# Patient Record
Sex: MALE | Race: WHITE
[De-identification: names, ages, dates, MRNs, and addresses within clinical notes are randomized per-mention and may not be internally consistent; named-entity substitution may affect disease eponyms.]

---

## 2018-02-07 ENCOUNTER — HOSPITAL ENCOUNTER (EMERGENCY)
Dept: HOSPITAL 62 - ER | Age: 33
LOS: 1 days | Discharge: LEFT BEFORE DISCHARGE | End: 2018-02-08
Payer: MEDICAID

## 2018-02-07 DIAGNOSIS — R11.0: ICD-10-CM

## 2018-02-07 DIAGNOSIS — F41.9: ICD-10-CM

## 2018-02-07 DIAGNOSIS — B34.9: ICD-10-CM

## 2018-02-07 DIAGNOSIS — R06.02: ICD-10-CM

## 2018-02-07 DIAGNOSIS — R09.89: ICD-10-CM

## 2018-02-07 DIAGNOSIS — F17.200: ICD-10-CM

## 2018-02-07 DIAGNOSIS — R05: ICD-10-CM

## 2018-02-07 DIAGNOSIS — R07.9: Primary | ICD-10-CM

## 2018-02-07 DIAGNOSIS — R63.0: ICD-10-CM

## 2018-02-07 DIAGNOSIS — F32.9: ICD-10-CM

## 2018-02-07 PROCEDURE — 82553 CREATINE MB FRACTION: CPT

## 2018-02-07 PROCEDURE — 71046 X-RAY EXAM CHEST 2 VIEWS: CPT

## 2018-02-07 PROCEDURE — 80053 COMPREHEN METABOLIC PANEL: CPT

## 2018-02-07 PROCEDURE — 82550 ASSAY OF CK (CPK): CPT

## 2018-02-07 PROCEDURE — 93010 ELECTROCARDIOGRAM REPORT: CPT

## 2018-02-07 PROCEDURE — 36415 COLL VENOUS BLD VENIPUNCTURE: CPT

## 2018-02-07 PROCEDURE — 85025 COMPLETE CBC W/AUTO DIFF WBC: CPT

## 2018-02-07 PROCEDURE — 84484 ASSAY OF TROPONIN QUANT: CPT

## 2018-02-07 PROCEDURE — 93005 ELECTROCARDIOGRAM TRACING: CPT

## 2018-02-07 PROCEDURE — 99285 EMERGENCY DEPT VISIT HI MDM: CPT

## 2018-02-08 VITALS — DIASTOLIC BLOOD PRESSURE: 81 MMHG | SYSTOLIC BLOOD PRESSURE: 125 MMHG

## 2018-02-08 LAB
ADD MANUAL DIFF: NO
ALBUMIN SERPL-MCNC: 5 G/DL (ref 3.5–5)
ALP SERPL-CCNC: 94 U/L (ref 38–126)
ALT SERPL-CCNC: 29 U/L (ref 21–72)
ANION GAP SERPL CALC-SCNC: 14 MMOL/L (ref 5–19)
AST SERPL-CCNC: 20 U/L (ref 17–59)
BASOPHILS # BLD AUTO: 0 10^3/UL (ref 0–0.2)
BASOPHILS NFR BLD AUTO: 0.4 % (ref 0–2)
BILIRUB DIRECT SERPL-MCNC: 0.5 MG/DL (ref 0–0.4)
BILIRUB SERPL-MCNC: 0.8 MG/DL (ref 0.2–1.3)
BUN SERPL-MCNC: 6 MG/DL (ref 7–20)
CALCIUM: 10.4 MG/DL (ref 8.4–10.2)
CHLORIDE SERPL-SCNC: 102 MMOL/L (ref 98–107)
CK MB SERPL-MCNC: < 0.22 NG/ML (ref ?–4.55)
CK SERPL-CCNC: 49 U/L (ref 55–170)
CO2 SERPL-SCNC: 28 MMOL/L (ref 22–30)
EOSINOPHIL # BLD AUTO: 0.1 10^3/UL (ref 0–0.6)
EOSINOPHIL NFR BLD AUTO: 0.9 % (ref 0–6)
ERYTHROCYTE [DISTWIDTH] IN BLOOD BY AUTOMATED COUNT: 13.3 % (ref 11.5–14)
GLUCOSE SERPL-MCNC: 104 MG/DL (ref 75–110)
HCT VFR BLD CALC: 46.5 % (ref 37.9–51)
HGB BLD-MCNC: 16.3 G/DL (ref 13.5–17)
LYMPHOCYTES # BLD AUTO: 2 10^3/UL (ref 0.5–4.7)
LYMPHOCYTES NFR BLD AUTO: 18.5 % (ref 13–45)
MCH RBC QN AUTO: 28.7 PG (ref 27–33.4)
MCHC RBC AUTO-ENTMCNC: 35 G/DL (ref 32–36)
MCV RBC AUTO: 82 FL (ref 80–97)
MONOCYTES # BLD AUTO: 0.6 10^3/UL (ref 0.1–1.4)
MONOCYTES NFR BLD AUTO: 5.9 % (ref 3–13)
NEUTROPHILS # BLD AUTO: 8 10^3/UL (ref 1.7–8.2)
NEUTS SEG NFR BLD AUTO: 74.3 % (ref 42–78)
PLATELET # BLD: 252 10^3/UL (ref 150–450)
POTASSIUM SERPL-SCNC: 4 MMOL/L (ref 3.6–5)
PROT SERPL-MCNC: 8.2 G/DL (ref 6.3–8.2)
RBC # BLD AUTO: 5.67 10^6/UL (ref 4.35–5.55)
SODIUM SERPL-SCNC: 143.9 MMOL/L (ref 137–145)
TOTAL CELLS COUNTED % (AUTO): 100 %
TROPONIN I SERPL-MCNC: < 0.012 NG/ML
WBC # BLD AUTO: 10.8 10^3/UL (ref 4–10.5)

## 2018-02-08 NOTE — EKG REPORT
SEVERITY:- ABNORMAL ECG -

SINUS RHYTHM

INCOMPLETE RBBB AND LAFB

:

Confirmed by: Nacho Martin 08-Feb-2018 09:19:26

## 2018-02-08 NOTE — RADIOLOGY REPORT (SQ)
EXAM DESCRIPTION: CHEST PA/LAT



CLINICAL HISTORY: chest pressure short of breath started this PM



COMPARISON: 4/30/2016



FINDINGS: Frontal and lateral views of the chest. Prior valve

repair. The cardiomediastinal silhouette has normal size and

contour. No consolidation, pneumothorax, or pleural effusion.  No

displaced rib fractures identified.  Upper abdominal soft tissues

are unremarkable.



IMPRESSION:



1. No acute pulmonary process identified.

## 2018-02-08 NOTE — ER DOCUMENT REPORT
ED Respiratory Problem





- General


Chief Complaint: Shortness Of Breath


Stated Complaint: SHORTNESS OF BREATH


Time Seen by Provider: 02/08/18 00:37


Mode of Arrival: Ambulatory


Information source: Patient


Notes: 





32-year-old male presents to ED for chest pressure and shortness of breath 

earlier today.  He states that it is all been resolved.  He states he has been 

nauseated with decreased appetite for 2 days.  He states she has been able to 

drink well with vomiting only when he coughs.  He states he has had a mitral 

valve replacement last year.  He states that he thinks all of his shortness of 

breath and pressure was from his anxiety and he is having none of the symptoms 

at this time.  He is deep diagnosed with intermittent explosive disorder 

anxiety and depression.


TRAVEL OUTSIDE OF THE [place] IN LAST 30 DAYS: No





- HPI


Patient complains to provider of: Short of breath


Onset: Yesterday - Soft now


Duration: Gone now


Quality of pain: No pain


Severity: None


Pain Level: Denies


Cough: Nonproductive


Sputum amount: None - She has not been coughing in a while


Associated symptoms: Cough, PND, Runny nose, Other - Vomited after cough, 

states he felt a lot of chest pressure earlier but he thinks it is his anxiety 

and depression.  He states he was anxious earlier but he is not at this time 

and no longer feels any chest pressure.  He states he has been nauseated for 

couple days but he is not nauseated at this time.  He states he has been able 

to drink fluids with no trouble


Similar symptoms previously: Yes


Recently seen / treated by doctor: No





- Related Data


Allergies/Adverse Reactions: 


 





tramadol Allergy (Verified 02/08/18 00:38)


 











Past Medical History





- General


Information source: Patient





- Social History


Smoking Status: Current Every Day Smoker


Cigarette use (# per day): Yes - Electronic cigarette


Chew tobacco use (# tins/day): No


Smoking Education Provided: Yes


Frequency of alcohol use: None


Drug Abuse: None


Occupation: Disabled due to explosive disorder


Lives with: Family


Family History: Arthritis, CAD, DM, Hyperlipidemia, Hypertension, Malignancy.  

denies: COPD, CVA, Thyroid Disfunction


Patient has suicidal ideation: No


Patient has homicidal ideation: No





- Past Medical History


Cardiac Medical History: Reports: Other - Mitral valve replacement last year


Pulmonary Medical History: Reports: None


EENT Medical History: Reports: None


Neurological Medical History: Reports: None


Endocrine Medical History: Reports: None


Renal/ Medical History: Reports: None


Malignancy Medical History: Reports None


GI Medical History: Reports: None


Musculoskeltal Medical History: Reports None


Skin Medical History: Reports None


Psychiatric Medical History: Reports: Hx Anxiety, Hx Depression, Other - 

Intermittent explosive disorder


Traumatic Medical History: Reports: None


Infectious Medical History: Reports: None


Past Surgical History: Reports: Hx Cardiac Surgery - Mitral valve replacement, 

Hx Oral Surgery





- Immunizations


Immunizations up to date: Yes


Hx Diphtheria, Pertussis, Tetanus Vaccination: Yes





Review of Systems





- Review of Systems


Constitutional: No symptoms reported


EENT: Sinus discharge


Cardiovascular: Chest pain


Respiratory: Short of breath - States he felt short of breath and pressure in 

his chest earlier while he was having an anxiety attack.  He states his been 

nauseated for 2 days but only vomits when coughs


Gastrointestinal: Nausea, Vomiting - Vomits only after he coughs.  denies: 

Abdominal pain


Genitourinary: No symptoms reported


Male Genitourinary: No symptoms reported


Musculoskeletal: No symptoms reported


Skin: No symptoms reported


Hematologic/Lymphatic: No symptoms reported


Neurological/Psychological: No symptoms reported


-: Yes All other systems reviewed and negative





Physical Exam





- Vital signs


Vitals: 


 











Temp Pulse Resp BP Pulse Ox


 


 98.7 F   100   16   109/82   98 


 


 02/08/18 00:37  02/08/18 00:37  02/08/18 00:37  02/08/18 00:37  02/08/18 00:37











Interpretation: Normal





- General


General appearance: Appears well, Alert





- HEENT


Head: Normocephalic, Atraumatic


Eyes: Normal


Pupils: PERRL


Ears: Normal


External canal: Normal


Tympanic membrane: Normal


Sinus: Normal


Nasal: Swelling, Clear rhinorrhea


Mouth/Lips: Normal


Mucous membranes: Normal


Pharynx: Post nasal drainage


Neck: Normal





- Respiratory


Respiratory status: No respiratory distress.  No: Respiratory distress


Chest status: Nontender.  No: Tender


Breath sounds: Nonproductive cough.  No: Productive cough, Rales, Rhonchi, 

Stridor, Wheezing


Chest palpation: Normal





- Cardiovascular


Rhythm: Regular


Heart sounds: Normal auscultation


Murmur: No





- Abdominal


Inspection: Normal


Distension: No distension


Bowel sounds: Normal


Tenderness: Nontender


Organomegaly: No organomegaly





- Back


Back: Normal, Nontender





- Extremities


General upper extremity: Normal inspection, Nontender, Normal color, Normal ROM

, Normal temperature


General lower extremity: Normal inspection, Nontender, Normal color, Normal ROM

, Normal temperature, Normal weight bearing.  No: Ernesto's sign





- Neurological


Neuro grossly intact: Yes


Cognition: Normal


Orientation: AAOx4


[place] Coma Scale Eye Opening: Spontaneous


[place] Coma Scale Verbal: Oriented


[place] Coma Scale Motor: Obeys Commands


[place] Coma Scale Total: 15


Speech: Normal


Motor strength normal: LUE, RUE, LLE, RLE


Sensory: Normal





- Psychological


Associated symptoms: Normal affect, Normal mood





- Skin


Skin Temperature: Warm


Skin Moisture: Dry


Skin Color: Normal





Course





- Re-evaluation


Re-evalutation: 





02/08/18 03:22


Discussed labs x-ray and EKG with Dr. Clarke.  Explained the patient wanted to 

leave without second set of enzymes done.  He states the patient will have to 

leave AGAINST MEDICAL ADVICE.  Patient and mother were instructed that they 

would need to leave AGAINST MEDICAL ADVICE if they did not want to wait for the 

further enzymes.  Patient and family verbalized that they are going to leave 

AGAINST MEDICAL ADVICE.





- Vital Signs


Vital signs: 


 











Temp Pulse Resp BP Pulse Ox


 


 97.4 F   102 H  16   125/81   98 


 


 02/08/18 02:26  02/08/18 02:26  02/08/18 02:26  02/08/18 02:26  02/08/18 02:26














- Laboratory


Result Diagrams: 


 02/08/18 00:51





 02/08/18 00:51


Laboratory results interpreted by me: 


 











  02/08/18 02/08/18





  00:51 00:51


 


WBC  10.8 H 


 


RBC  5.67 H 


 


BUN   6 L


 


Calcium   10.4 H


 


Direct Bilirubin   0.5 H


 


Creatine Kinase   49 L














- Diagnostic Test


Radiology reviewed: Image reviewed, Reports reviewed





Discharge





- Discharge


Clinical Impression: 


 Shortness of breath, Viral illness





Chest pain


Qualifiers:


 Chest pain type: unspecified Qualified Code(s): R07.9 - Chest pain, unspecified





Condition: Stable


Disposition: HOME, SELF-CARE


Instructions:  Family Physicians / Practices


Additional Instructions: 


CHEST PAIN OF UNCLEAR CAUSE:





     The exact cause of your chest pain isn't clear.  His been recommended to 

you that you get a second set of cardiac enzymes before discharge as you stated 

you do not want to wait for a second set of cardiac enzymes she would like to 

go home now.  





You understand you are signing out AGAINST MEDICAL ADVICE. 





 Further testing may be required to find the source of the pain.


     Most often, we find that this pain is coming from the chest wall -- the 

muscles or rib joints in the chest.  But chest pain can come from the lung and 

lung lining, the esophagus, the heart valves or heart lining, and even the 

stomach or gallbladder.


     Rest.  Eat lightly until the pain is gone.  We may prescribe medicine for 

pain and inflammation.


     You should call the physician immediately if the pain radiates to the 

shoulder, jaw or arms; if you start to run a fever or develop a cough; or if 

you develop shortness of breath, or other new or alarming symptoms.


 should return or follow up as you were instructed on your visit tod





X-ray are negative so far but as I have spoken with you we needed a second set 

of cardiac enzymes.  You stated you did not want to wait for the second set of 

cardiac enzymes he wanted to go home now.


I encouraged you to stay get these enzymes to ensure that your chest pain is 

benign.  You have decided to sign out AGAINST MEDICAL ADVICE.  You have stated 

she would follow up with her primary doctor and return to the emergency room 

immediately for any return of your chest pain.








ASPIRIN:


     Aspirin has been shown to have a beneficial effect on blood circulation by 

reducing the clotting effect of platelets in the blood.  These beneficial 

effects can be achieved by taking just a single baby (81 mg) aspirin a day.  It 

is recommended that any person over the age of forty take a single baby aspirin 

every day for heart and brain circulation, unless you are allergic to aspirin 

or have some significant bleeding disorder.  It is strongly recommended that 

people who have proven cardiac or blood circulation disturbances should take a 

baby aspirin every day.





Viral Syndrome





     The physician has diagnosed a viral infection.  Viruses not only cause 

"colds," but can cause many different symptoms including generalized aching, 

fever, headache, cough, diarrhea, nausea, vomiting, and fatigue.


     The treatment, for the most part, is simply relief of symptoms. This means 

that antibiotics are usually not given.  Rest, fluids, pain medications and, 

occasionally, medication for the specific symptoms that are most bothersome 

will be prescribed. Use good handwashing to avoid passing the virus to others. 

Shared toys should be cleaned with disinfectant. Clean the toilets, sinks, and 

counter surfaces in bathrooms. Launder clothing in hot water.


     Contact the physician if you develop any new or unusual symptoms such as 

severe headache, stiff neck, high fever, chest pain, productive cough, or 

shortness of breath.  You should be rechecked if you don't see marked 

improvement within seven to 10 days.





Antinausea Medication





     You have been given a medication to suppress nausea and vomiting. This 

type of medication can be given as a shot, pill, or suppository. It will 

usually last for many hours.  Pills and shots usually last six to eight hours, 

suppositories last about 12 hours.  For the typical illness, only one or two 

doses of the medication may be necessary.


     Mild lightheadedness may occur.  This type of medicine can cause 

drowsiness.  Do not drive or operate dangerous machinery while under its 

influence.  Do not mix with alcohol.


     See your doctor at once if you have muscle spasms or tightness, or 

uncontrollable motions (particularly of the neck, mouth, or jaw). Persistent 

vomiting or severe lightheadedness should also be evaluated by the physician.





FOLLOW-UP CARE:


If you have been referred to a physician for follow-up care, call the physician

s office for an appointment as you were instructed or within the next two days.

  If you experience worsening or a significant change in your symptoms, notify 

the physician immediately or return to the Emergency Department at any time for 

re-evaluation.


Prescriptions: 


Ondansetron [Zofran Odt 4 mg Tablet] 1 tab PO Q6H #15 tab.rapdis


Forms:  Smoking Cessation Education

## 2018-02-08 NOTE — ER DOCUMENT REPORT
ED Medical Screen (RME)





- General


Chief Complaint: Shortness Of Breath


Stated Complaint: SHORTNESS OF BREATH


Time Seen by Provider: 02/08/18 00:37


Mode of Arrival: Ambulatory


Information source: Patient


Notes: 





32-year-old male presents to ED for chest pressure that started this afternoon.

  He states he was been nauseated with cough and decreased appetite for 2 days.

  He states he is drinking well.  He states he had a mitral valve replacement 

last year.  He states it feels like somebody is sitting on his chest.  Lungs 

are clear respirations are regular and unlabored.














I have greeted and performed a rapid initial assessment of this patient.  A 

comprehensive ED assessment and evaluation of the patient, analysis of test 

results and completion of medical decision making process will be conducted by 

an additional ED providers.


TRAVEL OUTSIDE OF THE [place] IN LAST 30 DAYS: No





- Related Data


Allergies/Adverse Reactions: 


 





No Known Allergies Allergy (Verified 04/30/16 11:43)


 











Past Medical History


Renal/ Medical History: Denies: Hx Peritoneal Dialysis


Past Surgical History: Reports: Hx Oral Surgery





- Immunizations


Hx Diphtheria, Pertussis, Tetanus Vaccination: No





Physical Exam





- Vital signs


Vitals: 





 











Temp Pulse Resp BP Pulse Ox


 


 98.7 F   100   16   109/82   98 


 


 02/08/18 00:37  02/08/18 00:37  02/08/18 00:37  02/08/18 00:37  02/08/18 00:37














Course





- Vital Signs


Vital signs: 





 











Temp Pulse Resp BP Pulse Ox


 


 98.7 F   100   16   109/82   98 


 


 02/08/18 00:37  02/08/18 00:37  02/08/18 00:37  02/08/18 00:37  02/08/18 00:37

## 2019-03-28 NOTE — RADIOLOGY REPORT (SQ)
EXAM DESCRIPTION:  KNEE LEFT 2 VIEWS



COMPLETED DATE/TIME:  3/28/2019 10:54 am



REASON FOR STUDY:  LEFT ANTERIOR KNEE PAIN



COMPARISON:  None.



NUMBER OF VIEWS:  Two views left knee



LIMITATIONS:  None.



FINDINGS:  There is no acute or significant bone, joint or soft tissue abnormality.

OTHER: No other significant finding.



IMPRESSION:  NORMAL STUDY.



TECHNICAL DOCUMENTATION:  JOB ID:  [id number]



Reading location - IP/workstation name: [email or web address]

## 2019-08-14 NOTE — RADIOLOGY REPORT (SQ)
EXAM DESCRIPTION:  CT LT LOWER EXTREMITY WITHOUT



COMPLETED DATE/TIME:  8/14/2019 4:18 pm



REASON FOR STUDY:  M25.552 PAIN IN LEFT HIP M25.552  PAIN IN LEFT HIP



COMPARISON:  An outside hip radiographs.



TECHNIQUE:  CT scan of the left hip performed without intravenous or oral contrast.  Images reviewed 
with soft tissue and bone windows.  Reconstructed coronal and sagittal MPR images reviewed.  All imag
es stored on PACS.

All CT scanners at this facility use dose modulation, iterative reconstruction, and/or weight based d
osing when appropriate to reduce radiation dose to as low as reasonably achievable (ALARA).

[facility]: Dose Right  [facility]: CareDose    [facility]: Dose Right    [facility]: Teradose 4D    [facility]: Smart Technologies



RADIATION DOSE:   mGy.



LIMITATIONS:  None.



FINDINGS:  PELVIC BONES: No acute fracture. No worrisome bone lesions.

VISUALIZED SPINE: Not included.

SYMPTOMATIC HIP: There is protrusio acetabuli of uncertain etiology.  The joint space is narrowed.  T
here is subchondral sclerosis in the acetabulum.  There is some very small osteophytes at the articul
ar margin of the femoral head.

OPPOSITE HIP: Not included.

PELVIC SOFT TISSUES: No significant findings.

EXTRAPELVIC SOFT TISSUES: No significant findings.

OTHER: No other significant finding.



IMPRESSION:  Protrusio acetabuli of the left hip.  Degenerative joint disease.  No fracture or other 
acute abnormality.



TECHNICAL DOCUMENTATION:  JOB ID:  [id number]

Quality ID # [id number]: Final reports with documentation of one or more dose reduction techniques (e.g., Au
tomated exposure control, adjustment of the mA and/or kV according to patient size, use of iterative 
reconstruction technique)

 2011 [employer]- All Rights Reserved



Reading location - IP/workstation name: [email or web address]

## 2020-01-03 NOTE — ER DOCUMENT REPORT
ED Respiratory Problem





- General


Chief Complaint: Cold Symptoms


Stated Complaint: COLD/TROUBLE BREATHING


Time Seen by Provider: 01/03/20 17:04


Primary Care Provider: 


SHANTHI NELSON MD [EMERITUS] - Follow up as needed


Information source: Patient, Parent


Notes: 





Patient presents complaining of cough for the past week.  Patient states 

yesterday whenever he was laying down he had some difficulty breathing.  Patient

 denies any difficulty breathing at this time.  Patient denies any fever, chest 

pain nausea or vomiting.


TRAVEL OUTSIDE OF THE [place] IN LAST 30 DAYS: No





- HPI


Patient complains to provider of: Cough.  No: Asthma


Duration: Better


Quality of pain: No pain


Pain Level: Denies


Associated symptoms: Congestion, Cough.  denies: Fever, Headache, Wheezing


Similar symptoms previously: No


Recently seen / treated by doctor: No





- Related Data


Allergies/Adverse Reactions: 


                                        





tramadol Allergy (Verified 02/08/18 00:38)


   











Past Medical History





- General


Information source: Patient





- Social History


Smoking Status: Current Every Day Smoker


Frequency of alcohol use: None


Drug Abuse: None


Lives with: Family


Family History: Arthritis, CAD, DM, Hyperlipidemia, Hypertension, Malignancy.  

denies: COPD, CVA, Thyroid Disfunction


Patient has suicidal ideation: No


Patient has homicidal ideation: No





- Medical History


Medical History: Other - mild mr


Renal/ Medical History: Denies: Hx Peritoneal Dialysis


Psychiatric Medical History: Reports: Hx Anxiety, Hx Depression, Other - 

Explosive personality disorder


Past Surgical History: Reports: Hx Cardiac Surgery - Mitral valve replacement, 

Hx Oral Surgery





- Immunizations


Immunizations up to date: Yes


Hx Diphtheria, Pertussis, Tetanus Vaccination: Yes





Review of Systems





- Review of Systems


Constitutional: No symptoms reported.  denies: Fever, Recent illness


Cardiovascular: Dyspnea.  denies: Chest pain


Respiratory: Cough


Gastrointestinal: No symptoms reported.  denies: Vomiting


Genitourinary: No symptoms reported


Male Genitourinary: No symptoms reported


Musculoskeletal: No symptoms reported.  denies: Back pain, Leg swelling


Skin: No symptoms reported


Hematologic/Lymphatic: No symptoms reported


Neurological/Psychological: No symptoms reported





Physical Exam





- Vital signs


Vitals: 


                                        











Temp Pulse Resp BP Pulse Ox


 


 98.2 F   74   16   121/84   100 


 


 01/03/20 16:18  01/03/20 16:18  01/03/20 16:18  01/03/20 16:18  01/03/20 16:18














- General


General appearance: Appears well, Alert


In distress: None





- HEENT


Head: Normocephalic, Atraumatic


Eyes: Normal


Conjunctiva: Normal


Nasal: Normal


Mucous membranes: Normal


Neck: Normal, Supple.  No: Lymphadenopathy





- Respiratory


Respiratory status: No respiratory distress.  No: Labored


Chest status: Nontender.  No: Pain with cough, Pain with deep breathing


Breath sounds: Normal.  No: Rales, Rhonchi, Stridor, Wheezing


Chest palpation: Normal





- Cardiovascular


Rhythm: Regular


Heart sounds: S1 appreciated, S2 appreciated





- Abdominal


Inspection: Normal





- Back


Back: Normal, Nontender





- Extremities


General upper extremity: Normal inspection, Normal strength


General lower extremity: Normal inspection, Normal strength





- Neurological


Neuro grossly intact: Yes


Cognition: Normal


[place] Coma Scale Eye Opening: Spontaneous


[place] Coma Scale Verbal: Oriented


[place] Coma Scale Motor: Obeys Commands


[place] Coma Scale Total: 15





- Psychological


Associated symptoms: Normal affect, Normal mood





- Skin


Skin Temperature: Warm


Skin Moisture: Dry


Skin Color: Normal





Course





- Re-evaluation


Re-evalutation: 





01/03/20 19:52


Patient's respirations even unlabored, patient nontoxic in appearance.  Patient 

denies any chest discomfort or difficulty breathing at this time.  Chest x-ray 

reviewed, no concern for pneumonia or pneumothorax.  Mother states that she felt

 like patient symptoms started after he had a coughing fit and that she may have

 overreacted.  No concern for PE, patient with a heart score of 0.





- Vital Signs


Vital signs: 


                                        











Temp Pulse Resp BP Pulse Ox


 


 98.6 F   70   16   121/90 H  96 


 


 01/03/20 20:09  01/03/20 20:09  01/03/20 20:09  01/03/20 20:09  01/03/20 20:09














- Laboratory


Result Diagrams: 


                                 01/03/20 17:27





                                 01/03/20 17:27


Laboratory results interpreted by me: 


                                        











  01/03/20





  17:27


 


BUN  6 L











                               Labs- Entire Visit











  01/03/20 01/03/20 01/03/20





  17:27 17:27 17:27


 


WBC  9.6  


 


RBC  5.49  


 


Hgb  16.3  


 


Hct  45.6  


 


MCV  83  


 


MCH  29.6  


 


MCHC  35.7  


 


RDW  13.1  


 


Plt Count  244  


 


Lymph % (Auto)  25.0  


 


Mono % (Auto)  8.4  


 


Eos % (Auto)  2.1  


 


Baso % (Auto)  0.9  


 


Absolute Neuts (auto)  6.1  


 


Absolute Lymphs (auto)  2.4  


 


Absolute Monos (auto)  0.8  


 


Absolute Eos (auto)  0.2  


 


Absolute Basos (auto)  0.1  


 


Seg Neutrophils %  63.6  


 


Sodium   142.7 


 


Potassium   4.4 


 


Chloride   103 


 


Carbon Dioxide   29 


 


Anion Gap   11 


 


BUN   6 L 


 


Creatinine   0.91 


 


Est GFR ( Amer)   > 60 


 


Est GFR (MDRD) Non-Af   > 60 


 


Glucose   104 


 


Calcium   9.9 


 


Total Bilirubin   0.6 


 


Direct Bilirubin   0.2 


 


Neonat Total Bilirubin   Not Reportable 


 


Neonat Direct Bilirubin   Not Reportable 


 


Neonat Indirect Bili   Not Reportable 


 


AST   18 


 


ALT   11 


 


Alkaline Phosphatase   79 


 


Troponin I    < 0.012


 


Total Protein   8.0 


 


Albumin   4.7 














- Diagnostic Test


Radiology reviewed: Reports reviewed





- EKG Interpretation by Me


EKG shows normal: Sinus rhythm


Additional EKG results interpreted by me: 





01/03/20 19:56


, no ST elevation





Discharge





- Discharge


Clinical Impression: 


Upper respiratory infection


Qualifiers:


 URI type: unspecified URI Qualified Code(s): J06.9 - Acute upper respiratory 

infection, unspecified





Dyspnea


Qualifiers:


 Dyspnea type: unspecified Qualified Code(s): R06.00 - Dyspnea, unspecified





Condition: Stable


Disposition: HOME, SELF-CARE


Instructions:  Dyspnea, Nonspecific ([facility]), Upper Respiratory Illness ([facility])


Additional Instructions: 


Return immediately for any new or worsening symptoms





Followup with your primary care provider, call tomorrow to make a followup 

appointment








Prescriptions: 


Benzonatate [Tessalon Perle 100 mg Capsule] 100 mg PO Q8HP PRN #20 cap


 PRN Reason: 


Inhaler,Assist Device,Accesory [Optichamber] 1 each MC Q4 PRN #1 each


 PRN Reason: 


Albuterol Sulfate [Proair Hfa Inhalation Aerosol 8.5 gm Mdi] 2 puff IH Q4 PRN #1

mdi


 PRN Reason: 


Referrals: 


SHANTHI NELSON MD [EMERITUS] - Follow up as needed

## 2020-01-03 NOTE — RADIOLOGY REPORT (SQ)
EXAM DESCRIPTION:  CHEST 2 VIEWS



COMPLETED DATE/TIME:  1/3/2020 5:55 pm



REASON FOR STUDY:  cough, diff breathing



COMPARISON:  2/8/2018



TECHNIQUE:  Frontal and lateral radiographic views of the chest acquired.



NUMBER OF VIEWS:  Two view.



LIMITATIONS:  None.



FINDINGS:  LUNGS AND PLEURA: No pneumothorax. No consolidation or pleural effusion.

MEDIASTINUM AND HILAR STRUCTURES: Stable.

HEART AND VASCULAR STRUCTURES: Stable.

BONES: No acute findings.

HARDWARE: Ventral 5 replacement.

OTHER: No other significant finding.



IMPRESSION:  NO ACUTE FINDINGS.



TECHNICAL DOCUMENTATION:  JOB ID:  [id number]

[id number]

 2011 [employer]- All Rights Reserved



Reading location - IP/workstation name: [email or web address]

## 2020-01-03 NOTE — EKG REPORT
SEVERITY:- OTHERWISE NORMAL ECG -

SINUS OR ECTOPIC ATRIAL RHYTHM

LEFT AXIS DEVIATION

:

Confirmed by: Troy Mcguire MD 03-Jan-2020 19:46:35

## 2020-03-17 NOTE — DRAGON STRESS TEST REPORT
Rest/stress single isotope Cardiolite stress imaging using IV Lexiscan and gated
SPECT imaging.



Indication: PVCs, cardiomyopathy, preop cardiac evaluation.



Clinical history: This 34-year-old white male with no known coronary artery 
disease with cardiac risk factors of smoking and hypertension, current 
symptomatology includes PVCs palpitations.





Report:

Patient received IV Lexiscan 0.4 mg infused over 10 seconds and flushed.  The 
resting heart rate was 75  bpm and increased to 122 bpm at end infusion.  The 
resting blood pressure was 107/79 and increased to 119/94 at end infusion.

The patient had symptoms of tightness in chest and shortness of breath but no 
chest pains.



The resting 12-lead EKG showed coronary sinus rhythm 75 bpm with nonspecific ST-
T changes in aVL, early transition.  At end infusion, sinus tachycardia 122, 
nonspecific ST-T changes were unchanged.



Myocardial perfusion imaging was performed at rest 90 minutes following the 
injection of 10.54 mCi of Cardiolite.  10 seconds after the IV Lexiscan 
infusion, patient was injected with 31.4 mCi of Cardiolite and flushed.  Gated 
post stress tomographic imaging was performed 60 minutes after stress.



Findings:

The overall quality of the study is poor, this is due to extracardiac liver 
uptake superimposed on the apical and mid inferior wall of the left ventricle.



SPECT images showed IV Lexiscan induced reversible ischemia in the basal 
inferior wall with partial reversibility.  There was no fixed perfusion defect. 
Overall left ventricular systolic function was low normal at 54%, the transient 
ischemic dilatation ratio was 0.93 and normal.  Gated stress images showed no 
motion contraction in the basal inferior wall.



Recommendation

Consider cardiac catheterization before surgery.

SJD

## 2020-07-06 NOTE — ER DOCUMENT REPORT
ED General





- General


Chief Complaint: Nausea/Vomiting


Stated Complaint: FEVER/NAUSEA/EX HEART AND LUNG COND


Time Seen by Provider: 07/06/20 23:27


Notes: 


Patient is a 35-year-old male that comes emergency department for chief 

complaint of low-grade fevers for the past 3 days or so, today fever spiked up 

higher and patient was more ill-appearing, patient was also complaining of 

change in taste, occasional cough, generalized body aches, weakness, and also 

some burning with urination.  He denies discharge from the penis, he is not 

sexually active.  Patient has a history of developmental delay and is here with 

his mother.  Patient does have a history years ago of valvular surgery although 

he is not on anticoagulants, he simply took aspirin for a short time afterwards 

and is on no medications for this otherwise, he is follows with cardiology.  

Patient is not a diabetic, he recently stopped smoking, no recreational drugs 

reported.  No obvious recent contacts or travel.


TRAVEL OUTSIDE OF THE [place] IN LAST 30 DAYS: No





- Related Data


Allergies/Adverse Reactions: 


                                        





tramadol Allergy (Verified 02/08/18 00:38)


   











Past Medical History





- General


Information source: Patient





- Social History


Smoking Status: Former Smoker


Chew tobacco use (# tins/day): No


Frequency of alcohol use: None


Drug Abuse: None


Lives with: Family


Family History: Arthritis, CAD, DM, Hyperlipidemia, Hypertension, Malignancy


Patient has homicidal ideation: No


Renal/ Medical History: Denies: Hx Peritoneal Dialysis


Psychiatric Medical History: Reports: Hx Anxiety, Hx Depression


Past Surgical History: Reports: Hx Cardiac Surgery - Mitral valve replacement, 

Hx Oral Surgery





- Immunizations


Immunizations up to date: Yes


Hx Diphtheria, Pertussis, Tetanus Vaccination: Yes





Review of Systems





- Review of Systems


Constitutional: See HPI


EENT: No symptoms reported


Cardiovascular: No symptoms reported


Respiratory: No symptoms reported


Gastrointestinal: See HPI


Genitourinary: See HPI


Male Genitourinary: No symptoms reported


Musculoskeletal: No symptoms reported


Skin: No symptoms reported


Hematologic/Lymphatic: No symptoms reported


Neurological/Psychological: No symptoms reported





Physical Exam





- Vital signs


Vitals: 


                                        











Temp Pulse Resp BP Pulse Ox


 


 100.4 F   125 H  18   120/87 H  97 


 


 07/06/20 21:13  07/06/20 21:13  07/06/20 21:13  07/06/20 21:13  07/06/20 21:13














- Notes


Notes: 





GENERAL: Alert, interacts well. No acute distress.  Nontoxic in appearance


HEAD: Normocephalic, atraumatic.


EYES: Pupils equal, round, and reactive to light. Extraocular movements intact.


ENT: Oral mucosa moist, tongue midline. Oropharynx unremarkable. Airway patent. 

Nares patent, sinuses non-tender, ear canals unremarkable, TM's intact.


NECK: Full range of motion. Supple. Trachea midline. No lymphadenopathy.


LUNGS: Clear to auscultation bilaterally, no wheezes, rales, or rhonchi. No 

respiratory distress. Non-tender chest wall. 


HEART: Borderline tachycardia, no murmur, normal rhythm


ABDOMEN: Soft, non-tender. Non-distended. Bowel sounds present in all 4 

quadrants.


EXTREMITIES: Moves all 4 extremities spontaneously. No edema, normal radial and 

dorsalis pedis pulses bilaterally. No cyanosis.


BACK: no cervical, thoracic, lumbar midline tenderness. No saddle anesthesia, 

normal distal neurovascular exam. Moves all extremities in full range of motion.


NEUROLOGICAL: Alert and oriented x3. Normal speech. Cranial nerves II through 

XII grossly intact. Strength 5/5 in all extremities. 


PSYCH: Normal affect, normal mood.


SKIN: Warm and slightly flushed





Course





- Re-evaluation


Re-evalutation: 


Patient does appear to have some intellectual disability but he is cooperative, 

alert, and answers questions appropriately.  He has no nuchal rigidity or 

headache, he denies abdominal pain, he does report painful urination.  He is 

febrile and tachycardic initially, however this resolved with treatment.  ENT 

exam unremarkable, lungs clear, abdomen soft and benign, no CVA tenderness.  

Cultures are pending.  Lactic acid is unremarkable.





CBC unremarkable, chemistry unremarkable, urine does actually show an infection.

 Patient is not sexually active and has no discharge.  Because patient has never

had a UTI previously and because of his fever I discussed with mom and 

recommended CT to rule out obstructing ureterolithiasis, this was performed and 

showed no acute findings.  Patient has been started on Rocephin, discussed opt

ions, patient will be treated with cephalexin at home, he will also follow-up 

with primary care and potentially urology after that.  Discussed return 

precautions in detail.  I did offer COVID-19 testing but this was declined based

on patient's infection source.  Patient and mother state understanding and 

agreement with plan, stable and well-appearing at time of discharge.











- Vital Signs


Vital signs: 


                                        











Temp Pulse Resp BP Pulse Ox


 


 98.7 F   75   12   118/82   98 


 


 07/07/20 02:54  07/07/20 02:54  07/07/20 02:54  07/07/20 02:54  07/07/20 02:54














- Laboratory


Result Diagrams: 


                                 07/06/20 23:57





                                 07/06/20 23:57


Laboratory results interpreted by me: 


                                        











  07/06/20 07/07/20





  23:57 00:12


 


Sodium  135.8 L 


 


Chloride  97 L 


 


Glucose  119 H 


 


Total Protein  8.3 H 


 


Urine Protein   30 H


 


Urine Blood   SMALL H


 


Urine Nitrite (Reflex)   POSITIVE H


 


Urine Urobilinogen   4.0 H














Discharge





- Discharge


Clinical Impression: 


 Body aches, Dysuria





Fever


Qualifiers:


 Fever type: unspecified Qualified Code(s): R50.9 - Fever, unspecified





Urinary tract infection


Qualifiers:


 Urinary tract infection type: site unspecified Hematuria presence: without 

hematuria Qualified Code(s): N39.0 - Urinary tract infection, site not specified





Condition: Stable


Disposition: HOME, SELF-CARE


Additional Instructions: 


His evaluation shows a urinary tract infection.  His CAT scan does not show any 

concerning findings.


We have a urine and blood culture growing the lab, you will be contacted for any

concerning results.  Complete the antibiotics, take Tylenol and/or ibuprofen for

fever, give him plenty of fluids, allow him to rest.  Follow-up with primary 

care and consider urology referral because of the infection.





Return if he worsens including vomiting, severe worsening pain, spiking fevers, 

or if he does not look well.


Prescriptions: 


Cephalexin Monohydrate [Keflex 500 mg Capsule] 500 mg PO QID 10 Days #40 capsule

## 2020-07-07 NOTE — RADIOLOGY REPORT (SQ)
CLINICAL HISTORY:  UTI, fever; infected ureterolithiasis? 



COMPARISON: None.



TECHNIQUE: CT ABDOMEN PELVIS WITHOUT IV CONTRAST on 7/7/2020 1:02

AM CDT



This exam was performed according to our departmental

dose-optimization program, which includes automated exposure

control, adjustment of the mA and/or kV according to patient size

and/or use of iterative reconstruction technique.



FINDINGS: 



Lower lungs are clear.



Abdomen: Liver is fatty in attenuation. Normal in appearance.

There is no biliary dilatation. The pancreas and spleen are

normal in appearance. Adrenal glands and left kidney are normal.

There is a small upper pole right renal cyst. There is no

hydronephrosis.



Abdominal aorta is normal in course and caliber without aneurysm.

There is no free air. There is no retroperitoneal adenopathy.



Pelvis: There is no bowel obstruction. Urinary bladder is

unremarkable. There is no free fluid. Appendix is normal.



Skeleton: There are no acute osseous findings. No suspicious bony

lesions.



IMPRESSION: 



No definite acute inflammatory process. No renal or ureteral

calculi.

## 2020-07-07 NOTE — RADIOLOGY REPORT (SQ)
EXAM DESCRIPTION: 



XR CHEST 1 VIEW



COMPLETED DATE/TME:  07/06/2020 23:53



CLINICAL HISTORY: fever, weakness



COMPARISON: 1/3/2020



FINDINGS: Single frontal view of the chest. 



Cardiomediastinal silhouette: Normal size and contour. Prior

mitral valve repair.

Lungs: No consolidation, pneumothorax, or pleural effusion. 

Bones: No acute osseous abnormality. 

Upper abdomen: No abnormality identified.



IMPRESSION:



1. No acute pulmonary process identified.